# Patient Record
Sex: FEMALE | URBAN - METROPOLITAN AREA
[De-identification: names, ages, dates, MRNs, and addresses within clinical notes are randomized per-mention and may not be internally consistent; named-entity substitution may affect disease eponyms.]

---

## 2022-04-27 ENCOUNTER — CLAIMS CREATED FROM THE CLAIM WINDOW (OUTPATIENT)
Dept: URBAN - METROPOLITAN AREA TELEHEALTH 2 | Facility: TELEHEALTH | Age: 57
End: 2022-04-27
Payer: SELF-PAY

## 2022-04-27 DIAGNOSIS — Z12.11 SCREEN FOR COLON CANCER: ICD-10-CM

## 2022-04-27 DIAGNOSIS — R19.4 CHANGE IN BOWEL HABITS: ICD-10-CM

## 2022-04-27 PROBLEM — 305058001: Status: ACTIVE | Noted: 2022-04-27

## 2022-04-27 PROBLEM — 129851009: Status: ACTIVE | Noted: 2022-04-27

## 2022-04-27 PROCEDURE — 99203 OFFICE O/P NEW LOW 30 MIN: CPT | Performed by: INTERNAL MEDICINE

## 2022-04-27 RX ORDER — ESTRADIOL 0.04 MG/D
1 PATCH TO SKIN PATCH TRANSDERMAL
Status: ACTIVE | COMMUNITY

## 2022-04-27 RX ORDER — LIOTHYRONINE SODIUM 5 UG/1
2 TABLET ON AN EMPTY STOMACH TABLET ORAL ONCE A DAY
Status: ACTIVE | COMMUNITY

## 2022-04-27 RX ORDER — ESZOPICLONE 3 MG/1
1 TABLET IMMEDIATELY BEFORE BEDTIME TABLET, COATED ORAL ONCE A DAY
Status: ACTIVE | COMMUNITY

## 2022-04-27 RX ORDER — LEVOTHYROXINE SODIUM 88 UG/1
1 TABLET IN THE MORNING ON AN EMPTY STOMACH TABLET ORAL ONCE A DAY
Status: ACTIVE | COMMUNITY

## 2022-04-27 RX ORDER — PROGESTERONE 100 MG/1
AS DIRECTED CAPSULE ORAL
Status: ACTIVE | COMMUNITY

## 2022-04-27 NOTE — HPI-TODAY'S VISIT:
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 lives w . 3 kids. all local. baseline is a nl bm qam.  baseline no gerd. GI probs started Easter sunday. awoke Easter am and felt fine. ate a piece of toast...cramping in mid to lower abd...gurgling... then 1-2 soft to loose to watery diarrhea. now, everytime she eats, has this scenario. has lost 3#. no other family member ill. pt had eaten everything they did except the day before Easter, she ate at [employer] and they didnt. no fevers or chills.  has never had a colonoscopy.  has had no stool studies.

## 2022-05-24 ENCOUNTER — TELEPHONE ENCOUNTER (OUTPATIENT)
Dept: URBAN - METROPOLITAN AREA CLINIC 12 | Facility: CLINIC | Age: 57
End: 2022-05-24

## 2022-06-02 ENCOUNTER — OFFICE VISIT (OUTPATIENT)
Dept: URBAN - METROPOLITAN AREA CLINIC 111 | Facility: CLINIC | Age: 57
End: 2022-06-02
Payer: SELF-PAY

## 2022-06-02 ENCOUNTER — DASHBOARD ENCOUNTERS (OUTPATIENT)
Age: 57
End: 2022-06-02

## 2022-06-02 ENCOUNTER — WEB ENCOUNTER (OUTPATIENT)
Dept: URBAN - METROPOLITAN AREA CLINIC 111 | Facility: CLINIC | Age: 57
End: 2022-06-02

## 2022-06-02 VITALS
SYSTOLIC BLOOD PRESSURE: 114 MMHG | DIASTOLIC BLOOD PRESSURE: 77 MMHG | HEIGHT: 64 IN | HEART RATE: 87 BPM | WEIGHT: 159 LBS | BODY MASS INDEX: 27.14 KG/M2 | TEMPERATURE: 99 F

## 2022-06-02 DIAGNOSIS — K58.8 OTHER IRRITABLE BOWEL SYNDROME: ICD-10-CM

## 2022-06-02 DIAGNOSIS — Z12.11 COLON CANCER SCREENING: ICD-10-CM

## 2022-06-02 DIAGNOSIS — R11.0 NAUSEA: ICD-10-CM

## 2022-06-02 DIAGNOSIS — A09 INFECTIOUS GASTROENTERITIS: ICD-10-CM

## 2022-06-02 PROBLEM — 10743008: Status: ACTIVE | Noted: 2022-06-02

## 2022-06-02 PROCEDURE — 99203 OFFICE O/P NEW LOW 30 MIN: CPT | Performed by: STUDENT IN AN ORGANIZED HEALTH CARE EDUCATION/TRAINING PROGRAM

## 2022-06-02 RX ORDER — ONDANSETRON 4 MG/1
1 TABLET ON THE TONGUE AND ALLOW TO DISSOLVE TABLET, ORALLY DISINTEGRATING ORAL
Qty: 30 | Refills: 1 | OUTPATIENT
Start: 2022-06-02

## 2022-06-02 RX ORDER — PROGESTERONE 100 MG/1
AS DIRECTED CAPSULE ORAL
Status: ACTIVE | COMMUNITY

## 2022-06-02 RX ORDER — ESZOPICLONE 3 MG/1
1 TABLET IMMEDIATELY BEFORE BEDTIME TABLET, COATED ORAL ONCE A DAY
Status: ACTIVE | COMMUNITY

## 2022-06-02 RX ORDER — LEVOTHYROXINE SODIUM 88 UG/1
1 TABLET IN THE MORNING ON AN EMPTY STOMACH TABLET ORAL ONCE A DAY
Status: ACTIVE | COMMUNITY

## 2022-06-02 RX ORDER — LIOTHYRONINE SODIUM 5 UG/1
2 TABLET ON AN EMPTY STOMACH TABLET ORAL ONCE A DAY
Status: ACTIVE | COMMUNITY

## 2022-06-02 RX ORDER — ESTRADIOL 0.04 MG/D
1 PATCH TO SKIN PATCH TRANSDERMAL
Status: ACTIVE | COMMUNITY

## 2022-06-02 NOTE — HPI-TODAY'S VISIT:
56 yo F with Hashimoto's here for evaluation.  Sx since April, says that she may have had food poisoning then and then felt her Sx improved a little but did not resolve completely.  Intestinal cramping, diarrhea, + nausea. Has somewhat improved but still occuring. Having frequent bowel movements more than her baseline. No longer diarrhea consistency Says it feels like her intestines are twisting. Does not last for long. She has been eating more bland diet. Sx worsened with vegetables/salad intake. Tried OTC prilosec, did not improve her Sx.  No prior EGD or colonoscopy.  No rectal bleeding, weight loss, nocturnal Sx

## 2022-06-02 NOTE — PHYSICAL EXAM PSYCH:
(2) Some effort against gravity; limb cannot get to or maintain (if cued) 90 (or 45) degrees, drifts down to bed, but has some effort against gravity normal mood and affect, cognitive function intact

## 2022-06-22 ENCOUNTER — OFFICE VISIT (OUTPATIENT)
Dept: URBAN - METROPOLITAN AREA CLINIC 12 | Facility: CLINIC | Age: 57
End: 2022-06-22

## 2022-07-19 ENCOUNTER — OFFICE VISIT (OUTPATIENT)
Dept: URBAN - METROPOLITAN AREA LAB 3 | Facility: LAB | Age: 57
End: 2022-07-19